# Patient Record
Sex: MALE | URBAN - METROPOLITAN AREA
[De-identification: names, ages, dates, MRNs, and addresses within clinical notes are randomized per-mention and may not be internally consistent; named-entity substitution may affect disease eponyms.]

---

## 2021-03-31 ENCOUNTER — APPOINTMENT (RX ONLY)
Dept: URBAN - METROPOLITAN AREA CLINIC 304 | Facility: CLINIC | Age: 86
Setting detail: DERMATOLOGY
End: 2021-03-31

## 2021-03-31 DIAGNOSIS — D485 NEOPLASM OF UNCERTAIN BEHAVIOR OF SKIN: ICD-10-CM

## 2021-03-31 PROBLEM — D48.5 NEOPLASM OF UNCERTAIN BEHAVIOR OF SKIN: Status: ACTIVE | Noted: 2021-03-31

## 2021-03-31 PROCEDURE — 99202 OFFICE O/P NEW SF 15 MIN: CPT

## 2021-03-31 PROCEDURE — ? DEFER

## 2021-03-31 PROCEDURE — ? ADDITIONAL NOTES

## 2021-03-31 PROCEDURE — ? PRESCRIPTION

## 2021-03-31 PROCEDURE — ? COUNSELING

## 2021-03-31 RX ORDER — MUPIROCIN 20 MG/G
OINTMENT TOPICAL
Qty: 1 | Refills: 5 | COMMUNITY
Start: 2021-03-31

## 2021-03-31 RX ADMIN — MUPIROCIN: 20 OINTMENT TOPICAL at 00:00

## 2021-03-31 NOTE — PROCEDURE: ADDITIONAL NOTES
Additional Notes: Patient is currently on hospice.  I let the patient's daughter know that I would recommend a biopsy to be able to differentiate what the lesion is.  I also recommended patient comes in for a culture.  He is currently on flagyl from [facility].  Will send topical mupirocin to be put on wound during dressings.  \\n\\nDauwoodrow states that since the patient is on hospice, he will not be able to come into the office for culture or biopsy.  She will reach out to [facility] to see if patient can get wound assessment in hospice.  They are giving him norco for the pain. Encouraged daughter of patient to call us if she has any further concerns.\\n\\nWarned daughter that without treatment the lesion will likely get worse and could be a source of serious infection or even death. She verbalized understanding. Additional Notes: Patient is currently on hospice.  I let the patient's daughter know that I would recommend a biopsy to be able to differentiate what the lesion is.  I also recommended patient comes in for a culture.  He is currently on flagyl from [facility].  Will send topical mupirocin to be put on wound during dressings.  \\n\\nDauwoodrow states that since the patient is on hospice, he will not be able to come into the office for culture or biopsy.  She will reach out to [facility] to see if patient can get wound assessment in hospice.  They are giving him norco for the pain. Encouraged daughter of patient to call us if she has any further concerns.\\n\\nWarned daughter that without treatment the lesion will likely get worse and could be a source of serious infection or even death. She verbalized understanding.

## 2021-03-31 NOTE — HPI: SKIN LESION
What Type Of Note Output Would You Prefer (Optional)?: Standard Output
How Severe Is Your Skin Lesion?: severe
Has Your Skin Lesion Been Treated?: been treated
Is This A New Presentation, Or A Follow-Up?: Skin Lesion

## 2023-05-31 NOTE — PROCEDURE: DEFER
Procedure To Be Performed At Next Visit: Biopsy by shave method
Introduction Text (Please End With A Colon): The following procedure was deferred:
Detail Level: Detailed
see MDM